# Patient Record
Sex: FEMALE | Race: WHITE | NOT HISPANIC OR LATINO | URBAN - METROPOLITAN AREA
[De-identification: names, ages, dates, MRNs, and addresses within clinical notes are randomized per-mention and may not be internally consistent; named-entity substitution may affect disease eponyms.]

---

## 2021-10-04 ENCOUNTER — OFFICE VISIT (OUTPATIENT)
Dept: URBAN - METROPOLITAN AREA CLINIC 63 | Facility: CLINIC | Age: 46
End: 2021-10-04

## 2021-10-14 ENCOUNTER — OFFICE VISIT (OUTPATIENT)
Dept: URBAN - METROPOLITAN AREA CLINIC 63 | Facility: CLINIC | Age: 46
End: 2021-10-14

## 2021-10-21 ENCOUNTER — OFFICE VISIT (OUTPATIENT)
Dept: URBAN - METROPOLITAN AREA CLINIC 63 | Facility: CLINIC | Age: 46
End: 2021-10-21

## 2022-02-04 ENCOUNTER — OFFICE VISIT (OUTPATIENT)
Dept: URBAN - METROPOLITAN AREA CLINIC 60 | Facility: CLINIC | Age: 47
End: 2022-02-04

## 2022-02-08 ENCOUNTER — OFFICE VISIT (OUTPATIENT)
Dept: URBAN - METROPOLITAN AREA SURGERY CENTER 4 | Facility: SURGERY CENTER | Age: 47
End: 2022-02-08

## 2022-02-10 LAB — PATHOLOGY (INDENTED REPORT): (no result)

## 2022-02-22 ENCOUNTER — OFFICE VISIT (OUTPATIENT)
Dept: URBAN - METROPOLITAN AREA CLINIC 63 | Facility: CLINIC | Age: 47
End: 2022-02-22

## 2022-06-01 ENCOUNTER — OFFICE VISIT (OUTPATIENT)
Dept: URBAN - METROPOLITAN AREA CLINIC 63 | Facility: CLINIC | Age: 47
End: 2022-06-01

## 2022-06-16 ENCOUNTER — OFFICE VISIT (OUTPATIENT)
Dept: URBAN - METROPOLITAN AREA CLINIC 63 | Facility: CLINIC | Age: 47
End: 2022-06-16

## 2022-06-17 ENCOUNTER — LAB OUTSIDE AN ENCOUNTER (OUTPATIENT)
Dept: URBAN - METROPOLITAN AREA CLINIC 121 | Facility: CLINIC | Age: 47
End: 2022-06-17

## 2022-06-18 LAB
A/G RATIO: 1.9
ALBUMIN: (no result)
ALKALINE PHOSPHATASE: (no result)
ALT (SGPT): (no result)
AST (SGOT): (no result)
BASO (ABSOLUTE): (no result)
BASOS: (no result)
BILIRUBIN, TOTAL: (no result)
BUN/CREATININE RATIO: 12
BUN: (no result)
C DIFFICILE TOXINS A+B, EIA: NEGATIVE
CALCIUM: (no result)
CARBON DIOXIDE, TOTAL: (no result)
CHLORIDE: (no result)
CREATININE: (no result)
EGFR: (no result)
EOS (ABSOLUTE): (no result)
EOS: (no result)
GLOBULIN, TOTAL: (no result)
GLUCOSE: (no result)
HEMATOCRIT: (no result)
HEMATOLOGY COMMENTS:: (no result)
HEMOGLOBIN: (no result)
IMMATURE CELLS: (no result)
IMMATURE GRANS (ABS): (no result)
IMMATURE GRANULOCYTES: (no result)
LYMPHS (ABSOLUTE): (no result)
LYMPHS: (no result)
MCH: (no result)
MCHC: (no result)
MCV: (no result)
MONOCYTES(ABSOLUTE): (no result)
MONOCYTES: (no result)
NEUTROPHILS (ABSOLUTE): (no result)
NEUTROPHILS: (no result)
NRBC: (no result)
PLATELETS: (no result)
POTASSIUM: (no result)
PROTEIN, TOTAL: (no result)
RBC: (no result)
RDW: (no result)
SODIUM: (no result)
WBC: (no result)

## 2022-06-20 ENCOUNTER — LAB OUTSIDE AN ENCOUNTER (OUTPATIENT)
Dept: URBAN - METROPOLITAN AREA CLINIC 63 | Facility: CLINIC | Age: 47
End: 2022-06-20

## 2022-06-23 ENCOUNTER — OFFICE VISIT (OUTPATIENT)
Dept: URBAN - METROPOLITAN AREA CLINIC 63 | Facility: CLINIC | Age: 47
End: 2022-06-23

## 2022-06-24 ENCOUNTER — OFFICE VISIT (OUTPATIENT)
Dept: URBAN - METROPOLITAN AREA SURGERY CENTER 4 | Facility: SURGERY CENTER | Age: 47
End: 2022-06-24

## 2022-06-27 ENCOUNTER — TELEPHONE ENCOUNTER (OUTPATIENT)
Dept: URBAN - METROPOLITAN AREA CLINIC 63 | Facility: CLINIC | Age: 47
End: 2022-06-27

## 2022-06-30 LAB
CAMPYLOBACTER CULTURE: (no result)
CRYPTOSPORIDIUM SMEAR,STOOL: (no result)
CYCLOSPORA SMEAR, STOOL: (no result)
E COLI SHIGA TOXIN EIA: NEGATIVE
ISOSPORA SMEAR, STOOL: (no result)
Lab: (no result)
OVA + PARASITE EXAM: (no result)
SALMONELLA/SHIGELLA SCREEN: (no result)
WHITE BLOOD CELLS (WBC), STOOL: (no result)

## 2022-07-08 ENCOUNTER — TELEPHONE ENCOUNTER (OUTPATIENT)
Dept: URBAN - METROPOLITAN AREA CLINIC 63 | Facility: CLINIC | Age: 47
End: 2022-07-08

## 2022-07-09 ENCOUNTER — TELEPHONE ENCOUNTER (OUTPATIENT)
Dept: URBAN - METROPOLITAN AREA CLINIC 121 | Facility: CLINIC | Age: 47
End: 2022-07-09

## 2022-07-09 RX ORDER — DEXLANSOPRAZOLE 30 MG/1
ONCE A DAY CAPSULE, DELAYED RELEASE ORAL ONCE A DAY
Refills: 0 | OUTPATIENT
Start: 2022-02-04 | End: 2022-02-08

## 2022-07-09 RX ORDER — CETIRIZINE HYDROCHLORIDE 10 MG/1
TABLET, FILM COATED ORAL
Refills: 0 | OUTPATIENT
Start: 2022-01-31 | End: 2022-02-04

## 2022-07-09 RX ORDER — HYOSCYAMINE SULFATE 0.12 MG/1
TABLET, ORALLY DISINTEGRATING ORAL EVERY 8 HOURS
Refills: 3 | OUTPATIENT
Start: 2014-06-06 | End: 2014-07-25

## 2022-07-09 RX ORDER — CHOLECALCIFEROL (VITAMIN D3) 50 MCG
TABLET ORAL
Refills: 0 | OUTPATIENT
Start: 2012-06-08 | End: 2014-05-20

## 2022-07-09 RX ORDER — CETIRIZINE HYDROCHLORIDE 10 MG/1
TABLET, FILM COATED ORAL
Refills: 0 | OUTPATIENT
Start: 2022-02-04 | End: 2022-06-01

## 2022-07-09 RX ORDER — LANSOPRAZOLE 30 MG/1
TWICE A DAY, PO, QAM AND QHS CAPSULE, DELAYED RELEASE ORAL TWICE A DAY
Refills: 3 | OUTPATIENT
Start: 2022-03-17 | End: 2022-06-02

## 2022-07-09 RX ORDER — SUCRALFATE 1 G/1
TABLET ORAL
Refills: 0 | OUTPATIENT
Start: 2022-01-30 | End: 2022-02-04

## 2022-07-09 RX ORDER — DEXLANSOPRAZOLE 30 MG/1
ONCE A DAY, PO, QAM CAPSULE, DELAYED RELEASE ORAL ONCE A DAY
Refills: 3 | OUTPATIENT
Start: 2022-02-10 | End: 2022-03-17

## 2022-07-09 RX ORDER — OMEPRAZOLE 40 MG/1
TWICE A DAY CAPSULE, DELAYED RELEASE ORAL TWICE A DAY
Refills: 3 | OUTPATIENT
Start: 2021-10-04 | End: 2022-03-17

## 2022-07-09 RX ORDER — SUCRALFATE 1 G/1
TABLET ORAL
Refills: 0 | OUTPATIENT
Start: 2022-02-04 | End: 2022-06-01

## 2022-07-09 RX ORDER — TRANEXAMIC ACID 650 MG/1
TABLET ORAL TAKE AS DIRECTED
Refills: 0 | OUTPATIENT
Start: 2019-10-18 | End: 2019-10-18

## 2022-07-09 RX ORDER — HYDROCORTISONE 25 MG/G
APPLY LIBERALLY TO CLEAN, DRY AFFECTED AREA, TWICE DAILY X 10 DAYS CREAM TOPICAL TWICE A DAY
Refills: 1 | OUTPATIENT
Start: 2022-04-22 | End: 2022-06-16

## 2022-07-09 RX ORDER — CETIRIZINE HYDROCHLORIDE 10 MG/1
TABLET, FILM COATED ORAL ONCE A DAY
Refills: 0 | OUTPATIENT
Start: 2022-06-01 | End: 2022-06-16

## 2022-07-09 RX ORDER — METRONIDAZOLE 500 MG/1
3X A DAY FOR 14 DAYS TABLET ORAL TAKE AS DIRECTED
Refills: 0 | OUTPATIENT
Start: 2022-06-01 | End: 2022-06-16

## 2022-07-09 RX ORDER — PREDNISONE 10 MG/1
TABLET ORAL
Refills: 0 | OUTPATIENT
Start: 2022-02-01 | End: 2022-02-04

## 2022-07-09 RX ORDER — CETIRIZINE HYDROCHLORIDE 10 MG/1
TABLET, FILM COATED ORAL ONCE A DAY
Refills: 0 | OUTPATIENT
Start: 2022-06-16 | End: 2022-06-23

## 2022-07-09 RX ORDER — SUCRALFATE 1 G/1
FOUR TIMES A DAY TABLET ORAL
Refills: 2 | OUTPATIENT
Start: 2018-02-09 | End: 2018-08-27

## 2022-07-09 RX ORDER — DICYCLOMINE HYDROCHLORIDE 20 MG/1
TAKE ONE CAPSULE, PO, UP TO THREE TIMES A DAY, AS NEEDED FOR ABDOMINAL CRAMPING TABLET ORAL THREE TIMES A DAY
Refills: 1 | OUTPATIENT
Start: 2022-06-10 | End: 2022-06-16

## 2022-07-09 RX ORDER — CIPROFLOXACIN HYDROCHLORIDE 500 MG/1
TABLET, FILM COATED ORAL
Refills: 0 | OUTPATIENT
Start: 2022-05-28 | End: 2022-06-01

## 2022-07-09 RX ORDER — CIPROFLOXACIN HYDROCHLORIDE 500 MG/1
2X A DAY FOR 14 DAYS TABLET, FILM COATED ORAL TAKE AS DIRECTED
Refills: 0 | OUTPATIENT
Start: 2022-06-01 | End: 2022-06-16

## 2022-07-09 RX ORDER — SUCRALFATE 1 G/1
FOUR TIMES A DAY TABLET ORAL
Refills: 2 | OUTPATIENT
Start: 2018-08-27 | End: 2019-10-22

## 2022-07-09 RX ORDER — METRONIDAZOLE 500 MG/1
TABLET ORAL
Refills: 0 | OUTPATIENT
Start: 2022-05-28 | End: 2022-06-01

## 2022-07-09 RX ORDER — TRANEXAMIC ACID 650 MG/1
TABLET ORAL TAKE AS DIRECTED
Refills: 0 | OUTPATIENT
Start: 2018-02-06 | End: 2019-10-18

## 2022-07-10 ENCOUNTER — TELEPHONE ENCOUNTER (OUTPATIENT)
Dept: URBAN - METROPOLITAN AREA CLINIC 121 | Facility: CLINIC | Age: 47
End: 2022-07-10

## 2022-07-10 RX ORDER — CETIRIZINE HYDROCHLORIDE 10 MG/1
TABLET, FILM COATED ORAL ONCE A DAY
Refills: 0 | Status: ACTIVE | COMMUNITY
Start: 2022-06-23

## 2022-07-10 RX ORDER — SUCRALFATE 1 G/1
FOUR TIMES A DAY TABLET ORAL
Refills: 2 | Status: ACTIVE | COMMUNITY
Start: 2019-10-22

## 2022-07-10 RX ORDER — PREDNISONE 10 MG/1
TABLET ORAL
Refills: 0 | Status: ACTIVE | COMMUNITY
Start: 2022-02-04

## 2022-07-10 RX ORDER — LANSOPRAZOLE 30 MG/1
TWICE A DAY, PO, QAM AND QHS CAPSULE, DELAYED RELEASE ORAL TWICE A DAY
Refills: 3 | Status: ACTIVE | COMMUNITY
Start: 2022-06-02

## 2022-07-10 RX ORDER — PREDNISONE 10 MG/1
SIG 4 PO Q DAY AND TAPER BY 10MG PER 5 DAYS TABLET ORAL
Refills: 0 | Status: ACTIVE | COMMUNITY
Start: 2022-06-16

## 2022-07-11 ENCOUNTER — TELEPHONE ENCOUNTER (OUTPATIENT)
Dept: URBAN - METROPOLITAN AREA CLINIC 63 | Facility: CLINIC | Age: 47
End: 2022-07-11

## 2022-07-13 ENCOUNTER — OFFICE VISIT (OUTPATIENT)
Dept: URBAN - METROPOLITAN AREA CLINIC 63 | Facility: CLINIC | Age: 47
End: 2022-07-13
Payer: COMMERCIAL

## 2022-07-13 VITALS
WEIGHT: 171 LBS | DIASTOLIC BLOOD PRESSURE: 72 MMHG | OXYGEN SATURATION: 98 % | HEIGHT: 65 IN | SYSTOLIC BLOOD PRESSURE: 118 MMHG | BODY MASS INDEX: 28.49 KG/M2 | HEART RATE: 70 BPM | TEMPERATURE: 98 F

## 2022-07-13 DIAGNOSIS — K64.0 GRADE I INTERNAL HEMORRHOIDS: ICD-10-CM

## 2022-07-13 DIAGNOSIS — K57.30 COLON, DIVERTICULOSIS: ICD-10-CM

## 2022-07-13 PROCEDURE — 99213 OFFICE O/P EST LOW 20 MIN: CPT | Performed by: NURSE PRACTITIONER

## 2022-07-13 RX ORDER — CETIRIZINE HYDROCHLORIDE 10 MG/1
TABLET, FILM COATED ORAL ONCE A DAY
Refills: 0 | Status: ACTIVE | COMMUNITY
Start: 2022-06-23

## 2022-07-13 RX ORDER — OMEPRAZOLE MAGNESIUM 2.5 MG/1
AS DIRECTED GRANULE, DELAYED RELEASE ORAL
Status: ACTIVE | COMMUNITY

## 2022-07-13 RX ORDER — SUCRALFATE 1 G/1
FOUR TIMES A DAY TABLET ORAL
Refills: 2 | Status: ON HOLD | COMMUNITY
Start: 2019-10-22

## 2022-07-13 RX ORDER — PREDNISONE 10 MG/1
TABLET ORAL
Refills: 0 | Status: ON HOLD | COMMUNITY
Start: 2022-02-04

## 2022-07-13 RX ORDER — LANSOPRAZOLE 30 MG/1
TWICE A DAY, PO, QAM AND QHS CAPSULE, DELAYED RELEASE ORAL TWICE A DAY
Refills: 3 | Status: ON HOLD | COMMUNITY
Start: 2022-06-02

## 2022-07-13 NOTE — HPI-TODAY'S VISIT:
Elisa Barrientos is a very pleasant 46-year-old female seen in follow-up of colonoscopy and imaging (see results below).  She admits to tolerating the procedure very easily without any postprocedure complications.  She states her bowel habits have improved and she is averaging 1-2 unremarkable bowel movements per day after completing budesonide taper for microscopic colitis.  She also admits to better efficacy with Prilosec versus Prevacid and notes that her dyspepsia is worse on an empty stomach.

## 2022-07-13 NOTE — HPI-PREVIOUS LABS
Lab work dated 17 June 2022 demonstrates the following abnormalities: Hemoglobin 16.1, , MCH 35.1, RDW 11.3%, glucose 120, CO2 18.  All remaining lab values of CBC, CMP and C. difficile stool study are negative or within normal limits. ***************** Lab work dated 30 January 2021 demonstrates the following abnormalities: WBC 13.5, hemoglobin 16.4, hematocrit 47.3%, .1, MCH 35.0, glucose 108.  All remaining lab values of CBC, CMP, amylase, lipase, troponins, serum EtOH and blood culture are negative or within normal limits.

## 2022-07-13 NOTE — HPI-PREVIOUS IMAGING
CT abdomen and pelvis/14 June 2022.  1.  Apparent mild mucosal thickening of the sigmoid colon and rectum which may represent regional proctocolitis of uncertain etiology.  There is no evidence for diverticulosis or diverticulitis.  2.  Prior cholecystectomy and mild compensatory dilation of the CBD.  3.  Prior hysterectomy. *************** CT abdomen and pelvis with contrast/30 January 2022.  Small hiatal hernia with thickening of the distal esophagus.  Recommend correlation for reflux/esophagitis.  Prominent common bile duct at 1 cm in diameter.  While this may be in part due to cholecystectomy, recommend correlation with bilirubin to exclude superimposed obstruction (bilirubin 0.8, amylase 56, lipase 42, January 30, 2022, history of cholecystectomy). *************** Abdominal ultrasound/12 October 2021.  There is not a significant abnormality seen by abdominal ultrasound to explain the pain as questioned.

## 2022-07-13 NOTE — HPI-PREVIOUS PROCEDURES
Colonoscopy/24 June 2022. Unremarkable terminal ileum.  Mild colitis found in the sigmoid colon.  A tattoo was seen in the sigmoid colon.  Diverticulosis in the descending and sigmoid colon with internal hemorrhoids present.  Pathology demonstrates small bowel mucosa with lymphoid follicle in the terminal ileum, microscopic colitis in the ascending colon and colonic mucosa with congestion in the sigmoid colon.  All remaining findings are negative or benign.  Recommend repeat colonoscopy in 5 years due to a personal history of adenomatous polyps. ***************** EGD/30 October 2019.  Unremarkable esophagus.  A 4 cm hiatal hernia was present.  Minimal gastritis found in the gastric antrum.  Unremarkable duodenum.  Pathology demonstrates mild chronic inactive gastritis in the antral biopsy and features of reflux in the lower third esophageal biopsy with all remaining findings negative or benign. ***************** Colonoscopy/30 October 2019.  Diverticulosis in the descending and sigmoid colon with internal hemorrhoids present.  No specimens collected.  Recommend repeat colonoscopy in 5 years due to personal history of advanced tubulovillous adenomatous polyp. ***************** EGD/09 February 2018.  Unremarkable esophagus.  A 2 cm hiatal hernia was present.  Mild gastritis found in the gastric antrum.  Unremarkable duodenum.  Pathology demonstrates mild chronic gastritis with reactive changes in the antral biopsy and changes consistent with reflux disease in the lower third esophageal biopsy.  All remaining findings are negative or benign. ***************** Colonoscopy/06 August 2015.  The examination was otherwise normal on direct and retroflexion views.  Internal hemorrhoids.  No specimens collected.  Repeat colonoscopy in 10 years for screening purposes.

## 2022-07-17 PROBLEM — 398050005 DIVERTICULAR DISEASE OF COLON: Status: ACTIVE | Noted: 2022-07-17

## 2022-07-26 ENCOUNTER — TELEPHONE ENCOUNTER (OUTPATIENT)
Dept: URBAN - METROPOLITAN AREA CLINIC 63 | Facility: CLINIC | Age: 47
End: 2022-07-26

## 2022-07-26 RX ORDER — SUCRALFATE 1 G/1
UP TO FOUR TIMES A DAY TABLET ORAL
Qty: 360 | Refills: 1

## 2023-07-03 ENCOUNTER — DASHBOARD ENCOUNTERS (OUTPATIENT)
Age: 48
End: 2023-07-03

## 2023-07-03 ENCOUNTER — OFFICE VISIT (OUTPATIENT)
Dept: URBAN - METROPOLITAN AREA CLINIC 63 | Facility: CLINIC | Age: 48
End: 2023-07-03
Payer: COMMERCIAL

## 2023-07-03 VITALS
WEIGHT: 180 LBS | SYSTOLIC BLOOD PRESSURE: 124 MMHG | BODY MASS INDEX: 29.99 KG/M2 | HEART RATE: 76 BPM | TEMPERATURE: 98.8 F | DIASTOLIC BLOOD PRESSURE: 72 MMHG | OXYGEN SATURATION: 98 % | HEIGHT: 65 IN

## 2023-07-03 DIAGNOSIS — K58.0 IRRITABLE BOWEL SYNDROME WITH DIARRHEA: ICD-10-CM

## 2023-07-03 PROBLEM — 197125005: Status: ACTIVE | Noted: 2023-07-03

## 2023-07-03 PROCEDURE — 99214 OFFICE O/P EST MOD 30 MIN: CPT | Performed by: NURSE PRACTITIONER

## 2023-07-03 RX ORDER — RIFAXIMIN 550 MG/1
1 TABLET TABLET ORAL THREE TIMES A DAY
Qty: 42 TABLET | Refills: 3 | OUTPATIENT
Start: 2023-07-03 | End: 2023-08-28

## 2023-07-03 RX ORDER — ALUMINUM ZIRCONIUM TRICHLOROHYDREX GLY 0.19 G/G
AS DIRECTED STICK TOPICAL TWICE A DAY
Status: ACTIVE | COMMUNITY

## 2023-07-03 RX ORDER — SUCRALFATE 1 G/1
UP TO FOUR TIMES A DAY TABLET ORAL
Qty: 360 | Refills: 1 | Status: ACTIVE | COMMUNITY

## 2023-07-03 RX ORDER — NEBIVOLOL 2.5 MG/1
TABLET ORAL
Qty: 90 TABLET | Status: ACTIVE | COMMUNITY

## 2023-07-03 NOTE — HPI-PREVIOUS LABS
Lab work dated 05 January 2023 demonstrates the following abnormalities: , MCH 33.9, RDW 11.6, cholesterol 233, .  All remaining lab values of CBC, CMP and lipid panel are within normal limits. ********** Lab work dated 17 June 2022 demonstrates the following abnormalities: Hemoglobin 16.1, , MCH 35.1, RDW 11.3%, glucose 120, CO2 18.  All remaining lab values of CBC, CMP and C. difficile stool study are negative or within normal limits. ********** Lab work dated 30 January 2021 demonstrates the following abnormalities: WBC 13.5, hemoglobin 16.4, hematocrit 47.3%, .1, MCH 35.0, glucose 108.  All remaining lab values of CBC, CMP, amylase, lipase, troponins, serum EtOH and blood culture are negative or within normal limits.

## 2023-07-03 NOTE — HPI-PREVIOUS PROCEDURES
Colonoscopy/24 June 2022. Unremarkable terminal ileum.  Mild colitis found in the sigmoid colon.  A tattoo was seen in the sigmoid colon.  Diverticulosis in the descending and sigmoid colon with internal hemorrhoids present.  Pathology demonstrates small bowel mucosa with lymphoid follicle in the terminal ileum, microscopic colitis in the ascending colon and colonic mucosa with congestion in the sigmoid colon.  All remaining findings are negative or benign.  Recommend repeat colonoscopy in 5 years due to a personal history of adenomatous polyps. ********** EGD/30 October 2019.  Unremarkable esophagus.  A 4 cm hiatal hernia was present.  Minimal gastritis found in the gastric antrum.  Unremarkable duodenum.  Pathology demonstrates mild chronic inactive gastritis in the antral biopsy and features of reflux in the lower third esophageal biopsy with all remaining findings negative or benign. ********** Colonoscopy/30 October 2019.  Diverticulosis in the descending and sigmoid colon with internal hemorrhoids present.  No specimens collected.  Recommend repeat colonoscopy in 5 years due to personal history of advanced tubulovillous adenomatous polyp. ********** EGD/09 February 2018.  Unremarkable esophagus.  A 2 cm hiatal hernia was present.  Mild gastritis found in the gastric antrum.  Unremarkable duodenum.  Pathology demonstrates mild chronic gastritis with reactive changes in the antral biopsy and changes consistent with reflux disease in the lower third esophageal biopsy.  All remaining findings are negative or benign. ********** Colonoscopy/06 August 2015.  The examination was otherwise normal on direct and retroflexion views.  Internal hemorrhoids.  No specimens collected.  Repeat colonoscopy in 10 years for screening purposes. .

## 2023-07-03 NOTE — HPI-TODAY'S VISIT:
Elisa Barrientos is a very pleasant 47-year-old female, kindly referred back to our service due to IBS.  Past medical history is significant for endometriosis, migraines, cervicalgia, hiatal hernia, GERD, colon polyps and diverticulosis.  Past surgical history is significant for cholecystectomy and hysterectomy.  Her last complete colonoscopy was 24 June 2022 and was significant for microscopic colitis that was successfully treated with budesonide taper regimen.  Elisa presents today complaining of intermittent episodes of diarrhea that last for singular episodes and are preceded by abdominal cramping.  Upon these episodes, she does not state repetition, chronicity, hematochezia or melena.  She does have slight bright red blood on the paper after multiple bouts of diarrhea, and states that her abdominal cramping is resolved but she will have a residual "dull ache" in her abdomen for approximately 2-3 hours.  She is known to be lactose intolerant and gluten sensitive, and avoids these as best she can.  She is otherwise asymptomatic from a GI perspective and denies pyrosis, dysphagia, dyspepsia, persistent abdominal pain, nausea, vomiting or unintentional weight loss.  She uses dicyclomine and Imodium rarely, but with good efficacy and does not endorse any more stress than is usual.  She is looking for assistance with a more last effect due to increased frequency of occurance.

## 2023-07-03 NOTE — HPI-PREVIOUS IMAGING
CT abdomen and pelvis/14 June 2022.  1.  Apparent mild mucosal thickening of the sigmoid colon and rectum which may represent regional proctocolitis of uncertain etiology.  There is no evidence for diverticulosis or diverticulitis.  2.  Prior cholecystectomy and mild compensatory dilation of the CBD.  3.  Prior hysterectomy. ********** CT abdomen and pelvis with contrast/30 January 2022.  Small hiatal hernia with thickening of the distal esophagus.  Recommend correlation for reflux/esophagitis.  Prominent common bile duct at 1 cm in diameter.  While this may be in part due to cholecystectomy, recommend correlation with bilirubin to exclude superimposed obstruction (bilirubin 0.8, amylase 56, lipase 42, January 30, 2022, history of cholecystectomy). ********** Abdominal ultrasound/12 October 2021.  There is not a significant abnormality seen by abdominal ultrasound to explain the pain as questioned.

## 2023-10-04 ENCOUNTER — OFFICE VISIT (OUTPATIENT)
Dept: URBAN - METROPOLITAN AREA CLINIC 63 | Facility: CLINIC | Age: 48
End: 2023-10-04

## 2024-05-31 ENCOUNTER — TELEPHONE ENCOUNTER (OUTPATIENT)
Dept: URBAN - METROPOLITAN AREA CLINIC 63 | Facility: CLINIC | Age: 49
End: 2024-05-31

## 2024-06-14 ENCOUNTER — TELEPHONE ENCOUNTER (OUTPATIENT)
Dept: URBAN - METROPOLITAN AREA CLINIC 7 | Facility: CLINIC | Age: 49
End: 2024-06-14

## 2024-07-11 ENCOUNTER — OFFICE VISIT (OUTPATIENT)
Dept: URBAN - METROPOLITAN AREA CLINIC 63 | Facility: CLINIC | Age: 49
End: 2024-07-11
Payer: COMMERCIAL

## 2024-07-11 ENCOUNTER — TELEPHONE ENCOUNTER (OUTPATIENT)
Dept: URBAN - METROPOLITAN AREA CLINIC 63 | Facility: CLINIC | Age: 49
End: 2024-07-11

## 2024-07-11 VITALS
HEIGHT: 65 IN | HEART RATE: 64 BPM | BODY MASS INDEX: 29.49 KG/M2 | TEMPERATURE: 97.7 F | DIASTOLIC BLOOD PRESSURE: 80 MMHG | SYSTOLIC BLOOD PRESSURE: 130 MMHG | WEIGHT: 177 LBS | OXYGEN SATURATION: 98 %

## 2024-07-11 DIAGNOSIS — K58.0 IRRITABLE BOWEL SYNDROME WITH DIARRHEA: ICD-10-CM

## 2024-07-11 PROBLEM — 10743008: Status: ACTIVE | Noted: 2024-07-11

## 2024-07-11 PROCEDURE — 99214 OFFICE O/P EST MOD 30 MIN: CPT

## 2024-07-11 RX ORDER — RIFAXIMIN 550 MG/1
1 TABLET TABLET ORAL THREE TIMES A DAY
Qty: 42 TABLET | Refills: 0 | OUTPATIENT
Start: 2024-07-11 | End: 2024-07-25

## 2024-07-11 RX ORDER — SUCRALFATE 1 G/1
UP TO FOUR TIMES A DAY TABLET ORAL
Qty: 360 | Refills: 1 | Status: ACTIVE | COMMUNITY

## 2024-07-11 RX ORDER — ALUMINUM ZIRCONIUM TRICHLOROHYDREX GLY 0.19 G/G
AS DIRECTED STICK TOPICAL TWICE A DAY
Status: ACTIVE | COMMUNITY

## 2024-07-11 RX ORDER — DICYCLOMINE HYDROCHLORIDE 20 MG/1
1 TABLET TABLET ORAL THREE TIMES A DAY
Qty: 90 | Refills: 3 | OUTPATIENT
Start: 2024-07-11 | End: 2024-11-07

## 2024-07-11 RX ORDER — NEBIVOLOL 2.5 MG/1
TABLET ORAL
Qty: 90 TABLET | Status: ACTIVE | COMMUNITY

## 2024-07-11 NOTE — HPI-TODAY'S VISIT:
Elisa Barrientos is a very pleasant 48-year-old female coming in for IBS.  Past medical history is significant for endometriosis, migraines, cervicalgia, hiatal hernia, GERD, colon polyps and diverticulosis.  Past surgical history is significant for cholecystectomy and hysterectomy.  Her last complete colonoscopy was 24 June 2022 and was significant for microscopic colitis that was successfully treated with budesonide taper regimen.  Elisa presents today complaining of intermittent episodes of diarrhea that last for singular episodes and are preceded by abdominal cramping.  Upon these episodes, she does not state repetition, chronicity, hematochezia or melena.  She does have slight bright red blood on the paper after multiple bouts of diarrhea, and states that her abdominal cramping is resolved but she will have a residual "dull ache" in her abdomen for approximately 2-3 hours. She previously used dicyclomine for abdominal cramping but has not been taking that as of late.  She was doing well for a while with her regular bowel regimen but noticed over the past 6 weeks she has had return of attacks of abdominal cramping and diarrhea. She is known to be lactose intolerant and gluten sensitive, and avoids these as best she can. She is looking for assistance with a more last effect due to increased frequency of occurence.  She is otherwise asymptomatic from a GI perspective and denies pyrosis, dysphagia, dyspepsia, persistent abdominal pain, nausea, vomiting or unintentional weight loss.

## 2024-08-21 ENCOUNTER — OFFICE VISIT (OUTPATIENT)
Dept: URBAN - METROPOLITAN AREA CLINIC 63 | Facility: CLINIC | Age: 49
End: 2024-08-21

## 2025-04-25 ENCOUNTER — LAB OUTSIDE AN ENCOUNTER (OUTPATIENT)
Dept: URBAN - METROPOLITAN AREA CLINIC 63 | Facility: CLINIC | Age: 50
End: 2025-04-25

## 2025-04-25 ENCOUNTER — OFFICE VISIT (OUTPATIENT)
Dept: URBAN - METROPOLITAN AREA CLINIC 63 | Facility: CLINIC | Age: 50
End: 2025-04-25

## 2025-04-25 ENCOUNTER — TELEPHONE ENCOUNTER (OUTPATIENT)
Dept: URBAN - METROPOLITAN AREA CLINIC 63 | Facility: CLINIC | Age: 50
End: 2025-04-25

## 2025-04-25 PROBLEM — 235753003: Status: ACTIVE | Noted: 2025-04-25

## 2025-04-25 RX ORDER — ALUMINUM ZIRCONIUM TRICHLOROHYDREX GLY 0.19 G/G
AS DIRECTED STICK TOPICAL TWICE A DAY
Status: ACTIVE | COMMUNITY

## 2025-04-25 RX ORDER — BUDESONIDE 3 MG/1
3 CAPSULES ONCE A DAY FOR 42 DAYS, 2 CAPSULES ONCE A DAY FOR 14 DAYS, 1 CAPSULE ONCE A DAY FOR 14 DAYS CAPSULE ORAL ONCE A DAY
Qty: 168 CAPSULE | Refills: 0 | OUTPATIENT
Start: 2025-04-25

## 2025-04-25 RX ORDER — HYOSCYAMINE SULFATE 0.12 MG/1
1 TABLET ON THE TONGUE AND ALLOW TO DISSOLVE AS NEEDED TABLET, ORALLY DISINTEGRATING ORAL
Qty: 360 | Refills: 0 | OUTPATIENT
Start: 2025-04-25

## 2025-04-25 RX ORDER — SUCRALFATE 1 G/1
UP TO FOUR TIMES A DAY TABLET ORAL
Qty: 360 | Refills: 1 | Status: ACTIVE | COMMUNITY

## 2025-04-25 RX ORDER — NEBIVOLOL 2.5 MG/1
TABLET ORAL
Qty: 90 TABLET | Status: ACTIVE | COMMUNITY

## 2025-04-25 NOTE — HPI-TODAY'S VISIT:
This is a very pleasant 49-year-old female who presents to the office with a chief complaint of "abdominal pain".  Past medical history is significant for endometriosis, migraines, cervicalgia, GERD, diverticulosis, hypertension.  Past surgical history is significant for cholecystectomy, hysterectomy.  Family history is noncontributory. Last colonoscopy 6/24/2022, Dr. Heart, 5-year recall Last EGD, 10/30/2019, Dr. Heart Cardiologist:? . Patient was last seen in the office on 7/11/2024 with a chief complaint of IBS.  At last visit, patient was experiencing intermittent episodes of loose stools that last for a single episode preceded by abdominal cramping.  She will occasionally have BRBPR after multiple episodes of diarrhea.  And after the abdominal cramping is resolved seem to have residual "dull ache" in her abdomen for 2-3 hours.  Patient was given a refill of dicyclomine as she explains that she had taken this in the past, she was also given Xifaxan to trial for 2 weeks, as well as a referral to a nutritionist.  She was to follow-up in 4-6 weeks. . IBS-D back on xifacin has microscopic colitis, has been seeing  no mucus

## 2025-04-29 ENCOUNTER — TELEPHONE ENCOUNTER (OUTPATIENT)
Dept: URBAN - METROPOLITAN AREA CLINIC 63 | Facility: CLINIC | Age: 50
End: 2025-04-29

## 2025-05-06 NOTE — PHYSICAL EXAM EYES:
4 = No assist / stand by assistance Conjunctivae and eyelids appear normal,  Sclerae : White without injection

## 2025-05-20 ENCOUNTER — WEB ENCOUNTER (OUTPATIENT)
Dept: URBAN - METROPOLITAN AREA CLINIC 63 | Facility: CLINIC | Age: 50
End: 2025-05-20

## 2025-05-23 ENCOUNTER — OFFICE VISIT (OUTPATIENT)
Dept: URBAN - METROPOLITAN AREA CLINIC 63 | Facility: CLINIC | Age: 50
End: 2025-05-23